# Patient Record
Sex: MALE | Race: WHITE
[De-identification: names, ages, dates, MRNs, and addresses within clinical notes are randomized per-mention and may not be internally consistent; named-entity substitution may affect disease eponyms.]

---

## 2021-01-18 ENCOUNTER — HOSPITAL ENCOUNTER (INPATIENT)
Dept: HOSPITAL 19 - INPTSU | Age: 66
LOS: 12 days | Discharge: HOME | DRG: 708 | End: 2021-01-30
Attending: UROLOGY | Admitting: UROLOGY
Payer: MEDICARE

## 2021-01-18 DIAGNOSIS — G47.33: ICD-10-CM

## 2021-01-18 DIAGNOSIS — I10: ICD-10-CM

## 2021-01-18 DIAGNOSIS — E78.5: ICD-10-CM

## 2021-01-18 DIAGNOSIS — C61: Primary | ICD-10-CM

## 2021-01-18 DIAGNOSIS — Z80.42: ICD-10-CM

## 2021-01-18 PROCEDURE — A9284 NON-ELECTRONIC SPIROMETER: HCPCS

## 2021-01-18 PROCEDURE — A4314 CATH W/DRAINAGE 2-WAY LATEX: HCPCS

## 2021-01-28 LAB
ALBUMIN SERPL-MCNC: 4.2 GM/DL (ref 3.5–5)
ALP SERPL-CCNC: 57 U/L (ref 50–136)
ALT SERPL-CCNC: 28 U/L (ref 4–49)
ANION GAP SERPL CALC-SCNC: 5 MMOL/L (ref 7–16)
AST SERPL-CCNC: 41 U/L (ref 15–37)
BILIRUB SERPL-MCNC: 0.8 MG/DL (ref 0–1)
BUN SERPL-MCNC: 24 MG/DL (ref 9–20)
CALCIUM SERPL-MCNC: 9.2 MG/DL (ref 8.4–10.2)
CHLORIDE SERPL-SCNC: 104 MMOL/L (ref 98–107)
CO2 SERPL-SCNC: 29 MMOL/L (ref 22–30)
CREAT SERPL-SCNC: 0.89 UMOL/L (ref 0.66–1.25)
ERYTHROCYTE [DISTWIDTH] IN BLOOD BY AUTOMATED COUNT: 12.8 % (ref 11.5–14.5)
GLUCOSE SERPL-MCNC: 118 MG/DL (ref 74–106)
HCT VFR BLD AUTO: 43.2 % (ref 42–52)
HGB BLD-MCNC: 14.3 G/DL (ref 13.5–18)
MCH RBC QN AUTO: 32 PG (ref 27–31)
MCHC RBC AUTO-ENTMCNC: 33 G/DL (ref 33–37)
MCV RBC AUTO: 95 FL (ref 80–100)
PLATELET # BLD AUTO: 292 K/MM3 (ref 130–400)
PMV BLD AUTO: 9.5 FL (ref 7.4–10.4)
POTASSIUM SERPL-SCNC: 4.5 MMOL/L (ref 3.4–5)
PROT SERPL-MCNC: 7.5 GM/DL (ref 6.4–8.2)
RBC # BLD AUTO: 4.54 M/MM3 (ref 4.2–5.6)
SODIUM SERPL-SCNC: 139 MMOL/L (ref 137–145)

## 2021-01-29 VITALS — DIASTOLIC BLOOD PRESSURE: 64 MMHG | SYSTOLIC BLOOD PRESSURE: 125 MMHG | HEART RATE: 68 BPM | TEMPERATURE: 98.3 F

## 2021-01-29 VITALS — SYSTOLIC BLOOD PRESSURE: 132 MMHG | HEART RATE: 59 BPM | DIASTOLIC BLOOD PRESSURE: 53 MMHG | TEMPERATURE: 97.9 F

## 2021-01-29 VITALS — DIASTOLIC BLOOD PRESSURE: 72 MMHG | SYSTOLIC BLOOD PRESSURE: 144 MMHG | HEART RATE: 65 BPM

## 2021-01-29 VITALS — HEART RATE: 74 BPM | DIASTOLIC BLOOD PRESSURE: 64 MMHG | SYSTOLIC BLOOD PRESSURE: 152 MMHG

## 2021-01-29 VITALS — HEART RATE: 60 BPM | SYSTOLIC BLOOD PRESSURE: 140 MMHG | DIASTOLIC BLOOD PRESSURE: 73 MMHG

## 2021-01-29 VITALS — HEART RATE: 64 BPM | DIASTOLIC BLOOD PRESSURE: 69 MMHG | SYSTOLIC BLOOD PRESSURE: 131 MMHG

## 2021-01-29 VITALS — SYSTOLIC BLOOD PRESSURE: 141 MMHG | HEART RATE: 68 BPM | DIASTOLIC BLOOD PRESSURE: 75 MMHG

## 2021-01-29 VITALS — HEART RATE: 63 BPM | DIASTOLIC BLOOD PRESSURE: 79 MMHG | SYSTOLIC BLOOD PRESSURE: 145 MMHG

## 2021-01-29 VITALS — HEART RATE: 63 BPM | SYSTOLIC BLOOD PRESSURE: 149 MMHG | DIASTOLIC BLOOD PRESSURE: 68 MMHG

## 2021-01-29 VITALS — SYSTOLIC BLOOD PRESSURE: 138 MMHG | HEART RATE: 62 BPM | DIASTOLIC BLOOD PRESSURE: 70 MMHG

## 2021-01-29 VITALS — DIASTOLIC BLOOD PRESSURE: 73 MMHG | TEMPERATURE: 98.3 F | HEART RATE: 64 BPM | SYSTOLIC BLOOD PRESSURE: 143 MMHG

## 2021-01-29 PROCEDURE — 07TC4ZZ RESECTION OF PELVIS LYMPHATIC, PERCUTANEOUS ENDOSCOPIC APPROACH: ICD-10-PCS | Performed by: UROLOGY

## 2021-01-29 PROCEDURE — 8E0W4CZ ROBOTIC ASSISTED PROCEDURE OF TRUNK REGION, PERCUTANEOUS ENDOSCOPIC APPROACH: ICD-10-PCS | Performed by: UROLOGY

## 2021-01-29 PROCEDURE — 0VT04ZZ RESECTION OF PROSTATE, PERCUTANEOUS ENDOSCOPIC APPROACH: ICD-10-PCS | Performed by: UROLOGY

## 2021-01-29 NOTE — NUR
Patient up to the chair. One assist with gaitbelt. He felt lightheaded.
Patient sat in chair & then vomited. Zofran Prn given as well as scheduled
tylenol. Cool rag provided or his forehead.

## 2021-01-29 NOTE — NUR
The patient ambulated back to [place] [facility] independently using a steady gait and
appeared to tolerate the activity well. Vital signs obtained. Consent signed.
18G IV started in left hand with one stick, LR infusing without difficulty.
Blood obtained from IV start for lab as ordered. Call light is within reach.
The patient denies any further needs at this time. Will continue to monitor
the patient.

## 2021-01-29 NOTE — NUR
Post op vitals stable. Patient educated on ERAS protocol. Tolerating ice
chips. Dumont to DD with adequate light red output.

## 2021-01-29 NOTE — NUR
Patient up and ambulated halls. Steady gait, not light headed or dizzy. Dumont
to DD. IVF per orders. PIPO drain to compression with no drainage in the bulb.
Bedside report to night nurse

## 2021-01-29 NOTE — NUR
Pt. sitting up in chair at this time. Pt. is A&OX3, assessment complete.  INT
and IV to rt. hand patent.  Dumont catheter to DD, red-tinged urine noted. PIPO
to lt. abd. with gauze dressing CDI.  Minimal drainage noted.  Pt. denies
further needs, call light within reach.

## 2021-01-29 NOTE — NUR
Patient received post op from [facility] in [place]. Patient drowsy. Vss on O2. Dumont
to VJ. Rayo to bulb compression. Robotic sites edges well approximated. Scds
ble. Will montior closely.

## 2021-01-30 VITALS — TEMPERATURE: 98.6 F | HEART RATE: 66 BPM | DIASTOLIC BLOOD PRESSURE: 46 MMHG | SYSTOLIC BLOOD PRESSURE: 105 MMHG

## 2021-01-30 VITALS — SYSTOLIC BLOOD PRESSURE: 114 MMHG | TEMPERATURE: 98.4 F | HEART RATE: 61 BPM | DIASTOLIC BLOOD PRESSURE: 51 MMHG

## 2021-01-30 VITALS — HEART RATE: 70 BPM | TEMPERATURE: 98.5 F | SYSTOLIC BLOOD PRESSURE: 109 MMHG | DIASTOLIC BLOOD PRESSURE: 53 MMHG

## 2021-01-30 LAB
ANION GAP SERPL CALC-SCNC: 8 MMOL/L (ref 7–16)
BUN SERPL-MCNC: 21 MG/DL (ref 9–20)
CALCIUM SERPL-MCNC: 8.7 MG/DL (ref 8.4–10.2)
CHLORIDE SERPL-SCNC: 103 MMOL/L (ref 98–107)
CO2 SERPL-SCNC: 24 MMOL/L (ref 22–30)
CREAT SERPL-SCNC: 1.12 UMOL/L (ref 0.66–1.25)
GLUCOSE SERPL-MCNC: 126 MG/DL (ref 74–106)
HCT VFR BLD AUTO: 36.4 % (ref 42–52)
HGB BLD-MCNC: 12.2 G/DL (ref 13.5–18)
POTASSIUM SERPL-SCNC: 4.2 MMOL/L (ref 3.4–5)
SODIUM SERPL-SCNC: 135 MMOL/L (ref 137–145)

## 2021-01-30 NOTE — NUR
Plan to return home to [place] with wife Darlene [phone].
Patient reports that he resides in [place]. DPOA is Wife. Patient reports
that his PCP is Dr. Kamran Nixon in [place] and uses [facility] for RX. Patient
reports that use of [facility] but no other DME use. Patient denies any need for
additional services. Educated on services. Will continue to follow.

## 2021-01-30 NOTE — NUR
Patient is discharging home. Discharge instructions discussed with patient. No
questions verbalized. INT discontinued. Explained to clean vargas and care for
it. Explained how to empty vargas bag. Discussed how to switch and use the leg
bag if needed. He has a follow up appointment scheduled already. Dr Massey told
patient to take tylenol and motrin for pain at home, so no prescriptions to
give. Copies of discharge instructions sent with patient. All belongings
packed up by patient. Patients wife will be here around 1600.

## 2021-01-30 NOTE — NUR
PIPO drain discontinued as ordered. Patient tolerated well. There was no output
from the drain prior to removing drain. No complaints of pain. Fluids stopped
as well. Patient is going to go for a walk. He denies nausea. stated having
some gas pain. Incisions to abdomen are well approximated, no drainage, slight
bruising. Gauze dressing placed to PIPO site. Urine is yellow and clear. No
other changes at this time. Call light within reach.